# Patient Record
Sex: MALE | Race: WHITE | Employment: OTHER | URBAN - NONMETROPOLITAN AREA
[De-identification: names, ages, dates, MRNs, and addresses within clinical notes are randomized per-mention and may not be internally consistent; named-entity substitution may affect disease eponyms.]

---

## 2018-03-12 ENCOUNTER — ANESTHESIA EVENT (OUTPATIENT)
Dept: ENDOSCOPY | Age: 73
End: 2018-03-12
Payer: MEDICARE

## 2018-03-12 ENCOUNTER — HOSPITAL ENCOUNTER (OUTPATIENT)
Age: 73
Setting detail: OUTPATIENT SURGERY
Discharge: HOME | End: 2018-03-12
Attending: INTERNAL MEDICINE | Admitting: INTERNAL MEDICINE
Payer: MEDICARE

## 2018-03-12 ENCOUNTER — ANESTHESIA (OUTPATIENT)
Dept: ENDOSCOPY | Age: 73
End: 2018-03-12
Payer: MEDICARE

## 2018-03-12 VITALS
RESPIRATION RATE: 18 BRPM | DIASTOLIC BLOOD PRESSURE: 64 MMHG | OXYGEN SATURATION: 97 % | SYSTOLIC BLOOD PRESSURE: 92 MMHG

## 2018-03-12 VITALS
RESPIRATION RATE: 18 BRPM | DIASTOLIC BLOOD PRESSURE: 60 MMHG | HEIGHT: 69 IN | HEART RATE: 96 BPM | WEIGHT: 219 LBS | BODY MASS INDEX: 32.44 KG/M2 | SYSTOLIC BLOOD PRESSURE: 112 MMHG | TEMPERATURE: 96.7 F | OXYGEN SATURATION: 95 %

## 2018-03-12 PROCEDURE — 7100000000 HC PACU RECOVERY - FIRST 15 MIN: Performed by: INTERNAL MEDICINE

## 2018-03-12 PROCEDURE — 88305 TISSUE EXAM BY PATHOLOGIST: CPT

## 2018-03-12 PROCEDURE — 3609010300 HC COLONOSCOPY W/BIOPSY SINGLE/MULTIPLE: Performed by: INTERNAL MEDICINE

## 2018-03-12 PROCEDURE — 6360000002 HC RX W HCPCS: Performed by: SPECIALIST

## 2018-03-12 PROCEDURE — 2580000003 HC RX 258: Performed by: INTERNAL MEDICINE

## 2018-03-12 PROCEDURE — 3700000001 HC ADD 15 MINUTES (ANESTHESIA): Performed by: INTERNAL MEDICINE

## 2018-03-12 PROCEDURE — 2500000003 HC RX 250 WO HCPCS: Performed by: SPECIALIST

## 2018-03-12 PROCEDURE — 3700000000 HC ANESTHESIA ATTENDED CARE: Performed by: INTERNAL MEDICINE

## 2018-03-12 RX ORDER — LISINOPRIL 10 MG/1
10 TABLET ORAL 2 TIMES DAILY
COMMUNITY

## 2018-03-12 RX ORDER — SODIUM CHLORIDE 450 MG/100ML
INJECTION, SOLUTION INTRAVENOUS CONTINUOUS
Status: DISCONTINUED | OUTPATIENT
Start: 2018-03-12 | End: 2018-03-12 | Stop reason: HOSPADM

## 2018-03-12 RX ORDER — LIDOCAINE HYDROCHLORIDE 20 MG/ML
INJECTION, SOLUTION INFILTRATION; PERINEURAL PRN
Status: DISCONTINUED | OUTPATIENT
Start: 2018-03-12 | End: 2018-03-12 | Stop reason: SDUPTHER

## 2018-03-12 RX ORDER — LEVOTHYROXINE SODIUM 0.07 MG/1
75 TABLET ORAL DAILY
COMMUNITY

## 2018-03-12 RX ORDER — PROPOFOL 10 MG/ML
INJECTION, EMULSION INTRAVENOUS PRN
Status: DISCONTINUED | OUTPATIENT
Start: 2018-03-12 | End: 2018-03-12 | Stop reason: SDUPTHER

## 2018-03-12 RX ADMIN — PROPOFOL 50 MG: 10 INJECTION, EMULSION INTRAVENOUS at 09:13

## 2018-03-12 RX ADMIN — PROPOFOL 100 MG: 10 INJECTION, EMULSION INTRAVENOUS at 09:03

## 2018-03-12 RX ADMIN — SODIUM CHLORIDE: 4.5 INJECTION, SOLUTION INTRAVENOUS at 08:37

## 2018-03-12 RX ADMIN — PROPOFOL 100 MG: 10 INJECTION, EMULSION INTRAVENOUS at 08:57

## 2018-03-12 RX ADMIN — LIDOCAINE HYDROCHLORIDE 100 MG: 20 INJECTION, SOLUTION INFILTRATION; PERINEURAL at 08:57

## 2018-03-12 ASSESSMENT — PAIN - FUNCTIONAL ASSESSMENT: PAIN_FUNCTIONAL_ASSESSMENT: 0-10

## 2018-03-12 NOTE — OP NOTE
[facility] Endoscopy    CONSCIOUS SEDATION      Patient: Blossom Galan  : 1945  Acct#: [de-identified]    PLANNED PROCEDURE   []EGD  [x]Colonoscopy []Flex Sigmoid  []Other:  Indication: Pain control for GI procedure    Consent: I have discussed with the patient and/or the patient representative the indication, alternatives, and the possible risks and/or complications of the planned procedure and the anesthesia methods. The patient and/or patient representative appear to understand and agree to proceed. Pre-Sedation Documentation and Exam: I have personally completed a history, physical exam & review of systems for this patient (see notes). Airway Assessment: mac    Prior History of Anesthesia Complications: mac    ASA Classification: mac    Sedation/ Anesthesia Plan: mac      SEDATION  Planned agent:[x]Midazolam []Meperidine [x]Sublimaze []Morphine    Monitoring and Safety: The patient will be placed on a cardiac monitor and vital signs, pulse oximetry and level of consciousness will be continuously evaluated throughout the procedure. The patient will be closely monitored until recovery from the medications is complete and the patient has returned to baseline status. Respiratory therapy will be on standby during the procedure. I have reviewed with the patient and/or family the risks, benefits, and alternatives to the procedure.     Yoan Back MD, CNSP  3/12/2018, 8:56 AM    Post-Sedation Vital Signs: Vital signs were reviewed and were stable after the procedure (see flow sheet for vitals)            Post-Sedation Exam: Lungs: clear           Complications: none     Yoan Back MD, CNSP  3/12/2018,
In the descending  and sigmoid colon, severe diverticulosis noted as shown in picture #1, #6  and #7. The patient has one small diminutive polyp in the distal sigmoid  colon as shown in picture #7, removed by cold biopsy. On retroflex, small  internal hemorrhoids noted as shown in picture #8. Air was withdrawn as  the scope was removed. The patient tolerated the procedure well without  any immediate complications. Vitals including blood pressure, heart rate  and pulse ox were stable during the procedure and after the procedure. The  patient transferred to the recovery room in stable condition. IMPRESSION:  Colonoscopy to distal ileum, pan diverticulosis, left is more  than right. One 2-mm polyp in the sigmoid colon removed by cold biopsy. RECOMMENDATIONS:  Diverticular diet, fiber supplements. Call my office in  less than two weeks for the biopsy results. Thank you Carmen Kenny for letting me to do procedure on this patient. Do not  hesitate to call if you have any questions. My recommendations are as  above.         Malvin Enciso M.D.    D: 03/12/2018 9:33:02       T: 03/12/2018 9:35:33     [email or web address]  Job#: [id number]     Doc#: [id number]    CC:  Charlena Cabot, CNP Patsi Limerick, M.D.

## 2018-03-12 NOTE — H&P
[address] [place] [address]                         PREOPERATIVE HISTORY AND PHYSICAL    PATIENT NAME: Blue Chakraborty                  :        1945  MED REC NO:   [id number]                           ROOM:  ACCOUNT NO:   [de-identified]                           ADMIT DATE: 2018  PROVIDER:     Bhavani Joseph M.D. PROCEDURE:  Colonoscopy. INDICATIONS:  This 74years old pleasant male with personal history of  adenomatous polyps. He is undergoing further evaluation. Denied any  abdominal pain, nausea, vomiting. Denied any dysphagia, or painful  swallowing. PAST MEDICAL HISTORY:  Diverticulitis, hyperlipidemia, hypertension, skin  cancer. PAST SURGICAL HISTORY:  Appendectomy, hernia repair, multiple skin cancer  excision, vasectomy. MEDICATIONS:  Reviewed. PHYSICAL EXAMINATION:  VITAL SIGNS:  Temperature 96.7, pulse 100, respiratory rate 18, blood  pressure 167/76. LUNGS:  Equal to expansion on inspection. HEART:  Regular. Normal S1 and S2. No S3.  ABDOMEN:  Soft. Normoactive bowel sounds, nontender, nondistended. IMPRESSION:  This 74years old pleasant male with personal history of  adenomatous polyps. MY RECOMMENDATIONS:  Keep the patient nothing by mouth. Proceed with  colonoscopy as planned. The risks including but not limited to  perforation, bleeding, infection, adverse reaction to medicine, very slight  chance of missing significant lesions. The patient expressed his  understanding. Further plans pending the above test results.         Clem Alexander M.D.    D: 2018 8:56:52       T: 2018 9:39:51     [email or web address]  Job#: [id number]     Doc#: [id number]    CC:

## 2018-03-12 NOTE — PROGRESS NOTES
Patient able to pass flatus. Sitting up drinking Genice Harms. Dr Aura Ortiz reviewed findings with patient and brother.

## 2018-03-12 NOTE — ANESTHESIA PRE PROCEDURE
BEART, [id number]     Type & Screen (If Applicable):  No results found for: LABABO, [address]    Anesthesia Evaluation  Patient summary reviewed and Nursing notes reviewed  Airway: Mallampati: II  TM distance: >3 FB   Neck ROM: full  Mouth opening: > = 3 FB Dental: normal exam         Pulmonary:Negative Pulmonary ROS breath sounds clear to auscultation            Patient did not smoke on day of surgery. Cardiovascular:  Exercise tolerance: good (>4 METS),   (+) hypertension: no interval change, hyperlipidemia      ECG reviewed  Rhythm: regular  Rate: normal                    Neuro/Psych:               GI/Hepatic/Renal:   (+) GERD: no interval change, bowel prep,           Endo/Other:    (+) hypothyroidism::., .                 Abdominal:   (+) obese,         Vascular:                                        Anesthesia Plan      MAC     ASA 2       Induction: intravenous. Anesthetic plan and risks discussed with patient. Plan discussed with attending.                   Keara Tellez CRNA   3/12/2018

## (undated) DEVICE — TUBING IV STOPCOCK 48 CM 3 W